# Patient Record
Sex: FEMALE | Race: WHITE
[De-identification: names, ages, dates, MRNs, and addresses within clinical notes are randomized per-mention and may not be internally consistent; named-entity substitution may affect disease eponyms.]

---

## 2021-07-01 PROBLEM — Z00.00 ENCOUNTER FOR PREVENTIVE HEALTH EXAMINATION: Status: ACTIVE | Noted: 2021-07-01

## 2021-07-02 ENCOUNTER — APPOINTMENT (OUTPATIENT)
Dept: OTOLARYNGOLOGY | Facility: CLINIC | Age: 46
End: 2021-07-02
Payer: COMMERCIAL

## 2021-07-02 ENCOUNTER — APPOINTMENT (OUTPATIENT)
Dept: OTOLARYNGOLOGY | Facility: CLINIC | Age: 46
End: 2021-07-02
Payer: SELF-PAY

## 2021-07-02 ENCOUNTER — NON-APPOINTMENT (OUTPATIENT)
Age: 46
End: 2021-07-02

## 2021-07-02 VITALS — HEIGHT: 66.14 IN | TEMPERATURE: 97.9 F | WEIGHT: 132.28 LBS | BODY MASS INDEX: 21.26 KG/M2

## 2021-07-02 DIAGNOSIS — K21.9 GASTRO-ESOPHAGEAL REFLUX DISEASE W/OUT ESOPHAGITIS: ICD-10-CM

## 2021-07-02 DIAGNOSIS — Z78.9 OTHER SPECIFIED HEALTH STATUS: ICD-10-CM

## 2021-07-02 DIAGNOSIS — H90.3 SENSORINEURAL HEARING LOSS, BILATERAL: ICD-10-CM

## 2021-07-02 DIAGNOSIS — H93.13 TINNITUS, BILATERAL: ICD-10-CM

## 2021-07-02 PROCEDURE — 99072 ADDL SUPL MATRL&STAF TM PHE: CPT

## 2021-07-02 PROCEDURE — 99204 OFFICE O/P NEW MOD 45 MIN: CPT | Mod: 25

## 2021-07-02 PROCEDURE — 92567 TYMPANOMETRY: CPT

## 2021-07-02 PROCEDURE — 92557 COMPREHENSIVE HEARING TEST: CPT

## 2021-07-02 PROCEDURE — 31575 DIAGNOSTIC LARYNGOSCOPY: CPT

## 2021-07-02 PROCEDURE — V5010 ASSESSMENT FOR HEARING AID: CPT

## 2021-07-06 PROBLEM — H90.3 SENSORINEURAL HEARING LOSS (SNHL) OF BOTH EARS: Status: ACTIVE | Noted: 2021-07-02

## 2021-07-06 PROBLEM — K21.9 LPRD (LARYNGOPHARYNGEAL REFLUX DISEASE): Status: ACTIVE | Noted: 2021-07-06

## 2021-07-06 PROBLEM — H93.13 TINNITUS OF BOTH EARS: Status: ACTIVE | Noted: 2021-07-02

## 2021-07-06 PROBLEM — Z78.9 NON-SMOKER: Status: ACTIVE | Noted: 2021-07-02

## 2021-07-06 RX ORDER — FAMOTIDINE 20 MG/1
20 TABLET, FILM COATED ORAL
Qty: 30 | Refills: 2 | Status: ACTIVE | COMMUNITY
Start: 2021-07-06 | End: 1900-01-01

## 2021-07-06 NOTE — HISTORY OF PRESENT ILLNESS
[de-identified] : Patient reports bilateral hearing loss started 14 years ago during pregnancy. It has worsened slowly and steadily over time. Previous on the gram done 2 years ago when she moved to the [place] from [place] it showed high-frequency loss, but she does not have a copy with her today. Previous workup in [place] notable for 2 MRIs looking for neurologic source of hearing loss, both were normal by her report. No history of acoustic trauma. Her father had hearing loss at a younger age. \par \par Also notes history of reflux and throat discomfort. She is not on any reflux medications at this time. Never had pH probe. She does not observe dietary precautions. Notes that her son was recently evaluated and diagnosed with reflux

## 2021-07-06 NOTE — ASSESSMENT
[FreeTextEntry1] : Long-standing sensorineural hearing loss. Her exam is notable for sloping 2 severe bilateral loss, but her low frequencies are normal. She may be complicated to aid. We noted that her word discrimination is remarkably preserved to 88% bilaterally. She will followup for further hearing aide evaluation and bring in a copy of her previous audiogram from 2 years ago as well as the MRIs from [facility]. This may give us some sense as to whether or not this is a stable process or continuing to evolve\par \par First discomfort likely laryngopharyngeal reflux. Her endoscopy is consistent with this. I recommended dietary and behavioral precautions which she can follow together with her son, and I prescribed Pepcid H2 blocker at night.

## 2021-07-22 ENCOUNTER — APPOINTMENT (OUTPATIENT)
Dept: OTOLARYNGOLOGY | Facility: CLINIC | Age: 46
End: 2021-07-22
Payer: SELF-PAY

## 2021-07-22 PROCEDURE — 92593: CPT | Mod: NC

## 2021-07-26 ENCOUNTER — NON-APPOINTMENT (OUTPATIENT)
Age: 46
End: 2021-07-26

## 2021-07-30 ENCOUNTER — APPOINTMENT (OUTPATIENT)
Dept: OTOLARYNGOLOGY | Facility: CLINIC | Age: 46
End: 2021-07-30
Payer: SELF-PAY

## 2021-07-30 PROCEDURE — V5261I: CUSTOM

## 2021-08-09 ENCOUNTER — NON-APPOINTMENT (OUTPATIENT)
Age: 46
End: 2021-08-09

## 2021-08-10 ENCOUNTER — APPOINTMENT (OUTPATIENT)
Dept: OTOLARYNGOLOGY | Facility: CLINIC | Age: 46
End: 2021-08-10

## 2021-09-17 ENCOUNTER — NON-APPOINTMENT (OUTPATIENT)
Age: 46
End: 2021-09-17

## 2021-10-19 ENCOUNTER — APPOINTMENT (OUTPATIENT)
Dept: OTOLARYNGOLOGY | Facility: CLINIC | Age: 46
End: 2021-10-19
Payer: SELF-PAY

## 2021-10-19 PROCEDURE — 92593: CPT | Mod: NC

## 2022-04-26 ENCOUNTER — NON-APPOINTMENT (OUTPATIENT)
Age: 47
End: 2022-04-26

## 2022-05-03 ENCOUNTER — APPOINTMENT (OUTPATIENT)
Dept: OTOLARYNGOLOGY | Facility: CLINIC | Age: 47
End: 2022-05-03
Payer: SELF-PAY

## 2022-05-03 PROCEDURE — 92593: CPT | Mod: NC
